# Patient Record
(demographics unavailable — no encounter records)

---

## 2024-11-15 NOTE — HISTORY OF PRESENT ILLNESS
[None] : None [FreeTextEntry1] : 66 yr old male presents to follow up with kidney stones.   Pt is s/p right URS with balloon dilation, laser stone removal, no stent on 8/19/24  stone analysis: 90% Calcium oxalate dihydrate. 10% Calcium phosphate (apatite). [Dysuria] : no dysuria [Hematuria - Gross] : no gross hematuria

## 2024-11-15 NOTE — PHYSICAL EXAM
[General Appearance - Well Developed] : well developed [] : no respiratory distress [Normal Station and Gait] : the gait and station were normal for the patient's age [Skin Color & Pigmentation] : normal skin color and pigmentation [Oriented To Time, Place, And Person] : oriented to person, place, and time [No Focal Deficits] : no focal deficits

## 2024-11-15 NOTE — ASSESSMENT
[FreeTextEntry1] : Renal US reviewed- no stones, hydro or solid masses.   Has not done 24 hr urine x 2  plan  1) 24 hr urine before next visit - TTM in 3 weeks  2) Renal sono at or before next visit   3) Follow up in 6 months

## 2024-12-03 NOTE — ASSESSMENT
[FreeTextEntry1] : overall, diet very good main issue some variability noted is moderately acidic urine, maira considering the excellent volume  could consider 1/4 teaspoon baking soda in water bid to help alkalinized cont diet mod, maira with animal flesh protein can f/u as planned approx 5/2025 with f/u renal US

## 2024-12-03 NOTE — HISTORY OF PRESENT ILLNESS
[Other Location: e.g. School (Enter Location, City,State)___] : at [unfilled], at the time of the visit. [Other Location: e.g. Home (Enter Location, City,State)___] : at [unfilled] [Verbal consent obtained from patient] : the patient, [unfilled] [FreeTextEntry1] : The patient-doctor relationship has been established in audio HIPAA compliant communication. The patient's identity has been confirmed. The patient was previously emailed a copy of the telemedicine consent. They have had a chance to review and has now given verbal consent and has requested care to be assessed and treated via telemedicine. They understand there may be limitations in this process, and that they may need further followup care in the office and/or hospital settings.  Verbal consent given on Dec  3 2024 12:00PM  CHIKA MATUTE is a 66 year M who presents for follow up with kidney stones.  Pt is s/p right URS with balloon dilation, laser stone removal, no stent on 8/19/24  stone analysis: 90% Calcium oxalate dihydrate. 10% Calcium phosphate (apatite).  24 hour urine collection reviewed: volume: 2.5/2.58 calcium: 101/171 oxalate: 27/41 citrate: 443 /  745 pH: 5.35 / 5.67 UA: 488 / 736 sodium: 88/115 Animal protein markers: 35 /52  12/03/2024   The patient denies fevers, chills, nausea and/or vomiting, and no unexplained weight loss.  All pertinent parts of the patient PFSH (past medical, family, and social histories), laboratory, radiological studies and available physician notes were reviewed prior to starting the face-to-face portion of the telemedicine visit. Questionnaire results, where appropriate, were discussed with the patient.

## 2025-05-16 NOTE — HISTORY OF PRESENT ILLNESS
[FreeTextEntry1] : 66 yr old male presents to follow up with kidney stones.   Pt is s/p right URS with balloon dilation, laser stone removal, no stent on 8/19/24  stone analysis: 90% Calcium oxalate dihydrate. 10% Calcium phosphate (apatite).  Returns, feeling well. No new clinical issues. Pt raises concern over ED potential issues, but able to have AM and nighttime erections.  12/2024 24 hour urine collection reviewed: volume: 2.5/2.58 calcium: 101/171 oxalate: 27/41 citrate: 443 / 745 pH: 5.35 / 5.67 UA: 488 / 736 sodium: 88/115 Animal protein markers: 35 /52 [Dysuria] : no dysuria [Hematuria - Gross] : no gross hematuria [None] : None

## 2025-05-16 NOTE — ASSESSMENT
[FreeTextEntry1] : Renal US reviewed: no stone, no hydro, no solid renal mass. Small, sub-cm cyst, appears simple and no blood flow by doppler right kidney. Excellent!  RTC with renal US in approx 9 months (pt preference) Cont all diet prevention

## 2025-05-16 NOTE — PHYSICAL EXAM
[General Appearance - Well Developed] : well developed [Edema] : no peripheral edema [] : no respiratory distress [Abdomen Soft] : soft [Normal Station and Gait] : the gait and station were normal for the patient's age [Skin Color & Pigmentation] : normal skin color and pigmentation [No Focal Deficits] : no focal deficits [Oriented To Time, Place, And Person] : oriented to person, place, and time